# Patient Record
Sex: FEMALE | Race: BLACK OR AFRICAN AMERICAN | NOT HISPANIC OR LATINO
[De-identification: names, ages, dates, MRNs, and addresses within clinical notes are randomized per-mention and may not be internally consistent; named-entity substitution may affect disease eponyms.]

---

## 2023-12-29 ENCOUNTER — ASOB RESULT (OUTPATIENT)
Age: 33
End: 2023-12-29

## 2023-12-29 ENCOUNTER — APPOINTMENT (OUTPATIENT)
Dept: ANTEPARTUM | Facility: CLINIC | Age: 33
End: 2023-12-29
Payer: COMMERCIAL

## 2023-12-29 PROCEDURE — 76801 OB US < 14 WKS SINGLE FETUS: CPT

## 2023-12-29 PROCEDURE — 76802 OB US < 14 WKS ADDL FETUS: CPT

## 2024-01-11 ENCOUNTER — APPOINTMENT (OUTPATIENT)
Dept: ANTEPARTUM | Facility: CLINIC | Age: 34
End: 2024-01-11
Payer: COMMERCIAL

## 2024-01-11 ENCOUNTER — ASOB RESULT (OUTPATIENT)
Age: 34
End: 2024-01-11

## 2024-01-11 PROBLEM — Z00.00 ENCOUNTER FOR PREVENTIVE HEALTH EXAMINATION: Status: ACTIVE | Noted: 2024-01-11

## 2024-01-11 PROCEDURE — 76813 OB US NUCHAL MEAS 1 GEST: CPT | Mod: 59

## 2024-01-11 PROCEDURE — 76814 OB US NUCHAL MEAS ADD-ON: CPT

## 2024-01-11 PROCEDURE — 76801 OB US < 14 WKS SINGLE FETUS: CPT

## 2024-01-11 PROCEDURE — 76802 OB US < 14 WKS ADDL FETUS: CPT | Mod: 59

## 2024-01-11 PROCEDURE — 99202 OFFICE O/P NEW SF 15 MIN: CPT | Mod: 25

## 2024-02-09 ENCOUNTER — APPOINTMENT (OUTPATIENT)
Dept: ANTEPARTUM | Facility: CLINIC | Age: 34
End: 2024-02-09

## 2024-02-16 ENCOUNTER — ASOB RESULT (OUTPATIENT)
Age: 34
End: 2024-02-16

## 2024-02-16 ENCOUNTER — APPOINTMENT (OUTPATIENT)
Dept: ANTEPARTUM | Facility: CLINIC | Age: 34
End: 2024-02-16
Payer: COMMERCIAL

## 2024-02-16 PROCEDURE — 76817 TRANSVAGINAL US OBSTETRIC: CPT

## 2024-02-16 PROCEDURE — 76816 OB US FOLLOW-UP PER FETUS: CPT | Mod: 59

## 2024-03-08 ENCOUNTER — APPOINTMENT (OUTPATIENT)
Dept: ANTEPARTUM | Facility: CLINIC | Age: 34
End: 2024-03-08
Payer: COMMERCIAL

## 2024-03-08 ENCOUNTER — ASOB RESULT (OUTPATIENT)
Age: 34
End: 2024-03-08

## 2024-03-08 PROCEDURE — 76817 TRANSVAGINAL US OBSTETRIC: CPT

## 2024-03-08 PROCEDURE — 76812 OB US DETAILED ADDL FETUS: CPT | Mod: 59

## 2024-03-08 PROCEDURE — 76811 OB US DETAILED SNGL FETUS: CPT | Mod: 59

## 2024-03-21 ENCOUNTER — ASOB RESULT (OUTPATIENT)
Age: 34
End: 2024-03-21

## 2024-03-21 ENCOUNTER — APPOINTMENT (OUTPATIENT)
Dept: ANTEPARTUM | Facility: CLINIC | Age: 34
End: 2024-03-21
Payer: COMMERCIAL

## 2024-03-21 PROCEDURE — 76819 FETAL BIOPHYS PROFIL W/O NST: CPT

## 2024-03-21 PROCEDURE — 76816 OB US FOLLOW-UP PER FETUS: CPT

## 2024-04-05 ENCOUNTER — APPOINTMENT (OUTPATIENT)
Dept: ANTEPARTUM | Facility: CLINIC | Age: 34
End: 2024-04-05
Payer: COMMERCIAL

## 2024-04-05 ENCOUNTER — ASOB RESULT (OUTPATIENT)
Age: 34
End: 2024-04-05

## 2024-04-05 PROCEDURE — 76816 OB US FOLLOW-UP PER FETUS: CPT

## 2024-05-02 ENCOUNTER — ASOB RESULT (OUTPATIENT)
Age: 34
End: 2024-05-02

## 2024-05-02 ENCOUNTER — APPOINTMENT (OUTPATIENT)
Dept: ANTEPARTUM | Facility: CLINIC | Age: 34
End: 2024-05-02
Payer: COMMERCIAL

## 2024-05-02 PROCEDURE — 76819 FETAL BIOPHYS PROFIL W/O NST: CPT

## 2024-05-02 PROCEDURE — 76816 OB US FOLLOW-UP PER FETUS: CPT

## 2024-05-30 ENCOUNTER — ASOB RESULT (OUTPATIENT)
Age: 34
End: 2024-05-30

## 2024-05-30 ENCOUNTER — APPOINTMENT (OUTPATIENT)
Dept: ANTEPARTUM | Facility: CLINIC | Age: 34
End: 2024-05-30
Payer: COMMERCIAL

## 2024-05-30 PROCEDURE — 76816 OB US FOLLOW-UP PER FETUS: CPT

## 2024-05-30 PROCEDURE — 76819 FETAL BIOPHYS PROFIL W/O NST: CPT | Mod: 59

## 2024-06-28 ENCOUNTER — APPOINTMENT (OUTPATIENT)
Dept: ANTEPARTUM | Facility: CLINIC | Age: 34
End: 2024-06-28

## 2024-06-28 ENCOUNTER — ASOB RESULT (OUTPATIENT)
Age: 34
End: 2024-06-28

## 2024-06-28 ENCOUNTER — OUTPATIENT (OUTPATIENT)
Dept: INPATIENT UNIT | Facility: HOSPITAL | Age: 34
LOS: 1 days | Discharge: HOME | End: 2024-06-28
Payer: COMMERCIAL

## 2024-06-28 VITALS — HEART RATE: 76 BPM | SYSTOLIC BLOOD PRESSURE: 119 MMHG | DIASTOLIC BLOOD PRESSURE: 73 MMHG

## 2024-06-28 VITALS — RESPIRATION RATE: 17 BRPM | TEMPERATURE: 99 F

## 2024-06-28 DIAGNOSIS — O26.899 OTHER SPECIFIED PREGNANCY RELATED CONDITIONS, UNSPECIFIED TRIMESTER: ICD-10-CM

## 2024-06-28 PROCEDURE — 76816 OB US FOLLOW-UP PER FETUS: CPT | Mod: 59

## 2024-06-28 PROCEDURE — 76818 FETAL BIOPHYS PROFILE W/NST: CPT | Mod: 59

## 2024-06-28 PROCEDURE — 99221 1ST HOSP IP/OBS SF/LOW 40: CPT | Mod: 25

## 2024-06-28 PROCEDURE — 59025 FETAL NON-STRESS TEST: CPT | Mod: 26

## 2024-06-28 PROCEDURE — 76816 OB US FOLLOW-UP PER FETUS: CPT

## 2024-07-01 ENCOUNTER — OUTPATIENT (OUTPATIENT)
Dept: OUTPATIENT SERVICES | Facility: HOSPITAL | Age: 34
LOS: 1 days | End: 2024-07-01

## 2024-07-01 VITALS
HEIGHT: 67.5 IN | HEART RATE: 96 BPM | OXYGEN SATURATION: 99 % | TEMPERATURE: 98 F | SYSTOLIC BLOOD PRESSURE: 128 MMHG | DIASTOLIC BLOOD PRESSURE: 84 MMHG | RESPIRATION RATE: 18 BRPM | WEIGHT: 225.09 LBS

## 2024-07-01 DIAGNOSIS — O36.8330 MATERNAL CARE FOR ABNORMALITIES OF THE FETAL HEART RATE OR RHYTHM, THIRD TRIMESTER, NOT APPLICABLE OR UNSPECIFIED: ICD-10-CM

## 2024-07-01 DIAGNOSIS — O30.043 TWIN PREGNANCY, DICHORIONIC/DIAMNIOTIC, THIRD TRIMESTER: ICD-10-CM

## 2024-07-01 DIAGNOSIS — K08.409 PARTIAL LOSS OF TEETH, UNSPECIFIED CAUSE, UNSPECIFIED CLASS: Chronic | ICD-10-CM

## 2024-07-01 DIAGNOSIS — O34.219 MATERNAL CARE FOR UNSPECIFIED TYPE SCAR FROM PREVIOUS CESAREAN DELIVERY: ICD-10-CM

## 2024-07-01 DIAGNOSIS — Z98.890 OTHER SPECIFIED POSTPROCEDURAL STATES: Chronic | ICD-10-CM

## 2024-07-01 DIAGNOSIS — Z3A.36 36 WEEKS GESTATION OF PREGNANCY: ICD-10-CM

## 2024-07-01 LAB
APPEARANCE UR: ABNORMAL
BACTERIA # UR AUTO: ABNORMAL /HPF
BILIRUB UR-MCNC: NEGATIVE — SIGNIFICANT CHANGE UP
BLD GP AB SCN SERPL QL: NEGATIVE — SIGNIFICANT CHANGE UP
CAST: 4 /LPF — SIGNIFICANT CHANGE UP (ref 0–4)
COLOR SPEC: YELLOW — SIGNIFICANT CHANGE UP
DIFF PNL FLD: NEGATIVE — SIGNIFICANT CHANGE UP
GLUCOSE UR QL: NEGATIVE MG/DL — SIGNIFICANT CHANGE UP
HCT VFR BLD CALC: 30.7 % — LOW (ref 34.5–45)
HGB BLD-MCNC: 10.7 G/DL — LOW (ref 11.5–15.5)
KETONES UR-MCNC: 15 MG/DL
LEUKOCYTE ESTERASE UR-ACNC: ABNORMAL
MCHC RBC-ENTMCNC: 33.4 PG — SIGNIFICANT CHANGE UP (ref 27–34)
MCHC RBC-ENTMCNC: 34.9 GM/DL — SIGNIFICANT CHANGE UP (ref 32–36)
MCV RBC AUTO: 95.9 FL — SIGNIFICANT CHANGE UP (ref 80–100)
NITRITE UR-MCNC: NEGATIVE — SIGNIFICANT CHANGE UP
NRBC # BLD: 0 /100 WBCS — SIGNIFICANT CHANGE UP (ref 0–0)
NRBC # FLD: 0 K/UL — SIGNIFICANT CHANGE UP (ref 0–0)
PH UR: 6 — SIGNIFICANT CHANGE UP (ref 5–8)
PLATELET # BLD AUTO: 245 K/UL — SIGNIFICANT CHANGE UP (ref 150–400)
PROT UR-MCNC: 30 MG/DL
RBC # BLD: 3.2 M/UL — LOW (ref 3.8–5.2)
RBC # FLD: 12.6 % — SIGNIFICANT CHANGE UP (ref 10.3–14.5)
RBC CASTS # UR COMP ASSIST: 4 /HPF — SIGNIFICANT CHANGE UP (ref 0–4)
REVIEW: SIGNIFICANT CHANGE UP
RH IG SCN BLD-IMP: POSITIVE — SIGNIFICANT CHANGE UP
SP GR SPEC: 1.01 — SIGNIFICANT CHANGE UP (ref 1–1.03)
SQUAMOUS # UR AUTO: 10 /HPF — HIGH (ref 0–5)
UROBILINOGEN FLD QL: 0.2 MG/DL — SIGNIFICANT CHANGE UP (ref 0.2–1)
WBC # BLD: 7.33 K/UL — SIGNIFICANT CHANGE UP (ref 3.8–10.5)
WBC # FLD AUTO: 7.33 K/UL — SIGNIFICANT CHANGE UP (ref 3.8–10.5)
WBC UR QL: 8 /HPF — HIGH (ref 0–5)

## 2024-07-02 PROBLEM — Z78.9 OTHER SPECIFIED HEALTH STATUS: Chronic | Status: INACTIVE | Noted: 2024-06-28 | Resolved: 2024-07-01

## 2024-07-05 ENCOUNTER — APPOINTMENT (OUTPATIENT)
Dept: ANTEPARTUM | Facility: CLINIC | Age: 34
End: 2024-07-05
Payer: COMMERCIAL

## 2024-07-05 ENCOUNTER — ASOB RESULT (OUTPATIENT)
Age: 34
End: 2024-07-05

## 2024-07-05 PROCEDURE — 76818 FETAL BIOPHYS PROFILE W/NST: CPT

## 2024-07-05 PROCEDURE — 76818 FETAL BIOPHYS PROFILE W/NST: CPT | Mod: 59

## 2024-07-06 ENCOUNTER — TRANSCRIPTION ENCOUNTER (OUTPATIENT)
Age: 34
End: 2024-07-06

## 2024-07-07 ENCOUNTER — INPATIENT (INPATIENT)
Facility: HOSPITAL | Age: 34
LOS: 2 days | Discharge: HOME | End: 2024-07-10
Attending: OBSTETRICS & GYNECOLOGY | Admitting: OBSTETRICS & GYNECOLOGY
Payer: COMMERCIAL

## 2024-07-07 VITALS
SYSTOLIC BLOOD PRESSURE: 145 MMHG | HEART RATE: 80 BPM | TEMPERATURE: 99 F | RESPIRATION RATE: 16 BRPM | DIASTOLIC BLOOD PRESSURE: 88 MMHG

## 2024-07-07 DIAGNOSIS — O26.899 OTHER SPECIFIED PREGNANCY RELATED CONDITIONS, UNSPECIFIED TRIMESTER: ICD-10-CM

## 2024-07-07 DIAGNOSIS — K08.409 PARTIAL LOSS OF TEETH, UNSPECIFIED CAUSE, UNSPECIFIED CLASS: Chronic | ICD-10-CM

## 2024-07-07 DIAGNOSIS — Z98.890 OTHER SPECIFIED POSTPROCEDURAL STATES: Chronic | ICD-10-CM

## 2024-07-07 PROBLEM — H57.89 OTHER SPECIFIED DISORDERS OF EYE AND ADNEXA: Chronic | Status: ACTIVE | Noted: 2024-07-01

## 2024-07-07 LAB
ALBUMIN SERPL ELPH-MCNC: 3.5 G/DL — SIGNIFICANT CHANGE UP (ref 3.3–5)
ALP SERPL-CCNC: 178 U/L — HIGH (ref 40–120)
ALT FLD-CCNC: 10 U/L — SIGNIFICANT CHANGE UP (ref 4–33)
ANION GAP SERPL CALC-SCNC: 14 MMOL/L — SIGNIFICANT CHANGE UP (ref 7–14)
APPEARANCE UR: CLEAR — SIGNIFICANT CHANGE UP
AST SERPL-CCNC: 14 U/L — SIGNIFICANT CHANGE UP (ref 4–32)
BACTERIA # UR AUTO: NEGATIVE /HPF — SIGNIFICANT CHANGE UP
BASOPHILS # BLD AUTO: 0.02 K/UL — SIGNIFICANT CHANGE UP (ref 0–0.2)
BASOPHILS NFR BLD AUTO: 0.3 % — SIGNIFICANT CHANGE UP (ref 0–2)
BILIRUB SERPL-MCNC: 1.3 MG/DL — HIGH (ref 0.2–1.2)
BILIRUB UR-MCNC: NEGATIVE — SIGNIFICANT CHANGE UP
BLD GP AB SCN SERPL QL: NEGATIVE — SIGNIFICANT CHANGE UP
BUN SERPL-MCNC: 5 MG/DL — LOW (ref 7–23)
CALCIUM SERPL-MCNC: 9.5 MG/DL — SIGNIFICANT CHANGE UP (ref 8.4–10.5)
CAST: 0 /LPF — SIGNIFICANT CHANGE UP (ref 0–4)
CHLORIDE SERPL-SCNC: 103 MMOL/L — SIGNIFICANT CHANGE UP (ref 98–107)
CO2 SERPL-SCNC: 21 MMOL/L — LOW (ref 22–31)
COLOR SPEC: YELLOW — SIGNIFICANT CHANGE UP
CREAT ?TM UR-MCNC: 37 MG/DL — SIGNIFICANT CHANGE UP
CREAT SERPL-MCNC: 0.83 MG/DL — SIGNIFICANT CHANGE UP (ref 0.5–1.3)
DIFF PNL FLD: ABNORMAL
EGFR: 96 ML/MIN/1.73M2 — SIGNIFICANT CHANGE UP
EOSINOPHIL # BLD AUTO: 0.05 K/UL — SIGNIFICANT CHANGE UP (ref 0–0.5)
EOSINOPHIL NFR BLD AUTO: 0.7 % — SIGNIFICANT CHANGE UP (ref 0–6)
GLUCOSE SERPL-MCNC: 76 MG/DL — SIGNIFICANT CHANGE UP (ref 70–99)
GLUCOSE UR QL: NEGATIVE MG/DL — SIGNIFICANT CHANGE UP
HCT VFR BLD CALC: 31 % — LOW (ref 34.5–45)
HGB BLD-MCNC: 10.9 G/DL — LOW (ref 11.5–15.5)
IANC: 4.8 K/UL — SIGNIFICANT CHANGE UP (ref 1.8–7.4)
IMM GRANULOCYTES NFR BLD AUTO: 0.3 % — SIGNIFICANT CHANGE UP (ref 0–0.9)
KETONES UR-MCNC: NEGATIVE MG/DL — SIGNIFICANT CHANGE UP
LDH SERPL L TO P-CCNC: 178 U/L — SIGNIFICANT CHANGE UP (ref 135–225)
LEUKOCYTE ESTERASE UR-ACNC: NEGATIVE — SIGNIFICANT CHANGE UP
LYMPHOCYTES # BLD AUTO: 1.63 K/UL — SIGNIFICANT CHANGE UP (ref 1–3.3)
LYMPHOCYTES # BLD AUTO: 23.4 % — SIGNIFICANT CHANGE UP (ref 13–44)
MCHC RBC-ENTMCNC: 34 PG — SIGNIFICANT CHANGE UP (ref 27–34)
MCHC RBC-ENTMCNC: 35.2 GM/DL — SIGNIFICANT CHANGE UP (ref 32–36)
MCV RBC AUTO: 96.6 FL — SIGNIFICANT CHANGE UP (ref 80–100)
MONOCYTES # BLD AUTO: 0.46 K/UL — SIGNIFICANT CHANGE UP (ref 0–0.9)
MONOCYTES NFR BLD AUTO: 6.6 % — SIGNIFICANT CHANGE UP (ref 2–14)
NEUTROPHILS # BLD AUTO: 4.8 K/UL — SIGNIFICANT CHANGE UP (ref 1.8–7.4)
NEUTROPHILS NFR BLD AUTO: 68.7 % — SIGNIFICANT CHANGE UP (ref 43–77)
NITRITE UR-MCNC: NEGATIVE — SIGNIFICANT CHANGE UP
NRBC # BLD: 0 /100 WBCS — SIGNIFICANT CHANGE UP (ref 0–0)
NRBC # FLD: 0 K/UL — SIGNIFICANT CHANGE UP (ref 0–0)
PH UR: 7 — SIGNIFICANT CHANGE UP (ref 5–8)
PLATELET # BLD AUTO: 222 K/UL — SIGNIFICANT CHANGE UP (ref 150–400)
POTASSIUM SERPL-MCNC: 3.8 MMOL/L — SIGNIFICANT CHANGE UP (ref 3.5–5.3)
POTASSIUM SERPL-SCNC: 3.8 MMOL/L — SIGNIFICANT CHANGE UP (ref 3.5–5.3)
PROT ?TM UR-MCNC: 7 MG/DL — SIGNIFICANT CHANGE UP
PROT SERPL-MCNC: 6.6 G/DL — SIGNIFICANT CHANGE UP (ref 6–8.3)
PROT UR-MCNC: NEGATIVE MG/DL — SIGNIFICANT CHANGE UP
PROT/CREAT UR-RTO: 0.2 RATIO — SIGNIFICANT CHANGE UP (ref 0–0.2)
RBC # BLD: 3.21 M/UL — LOW (ref 3.8–5.2)
RBC # FLD: 12.3 % — SIGNIFICANT CHANGE UP (ref 10.3–14.5)
RBC CASTS # UR COMP ASSIST: 0 /HPF — SIGNIFICANT CHANGE UP (ref 0–4)
RH IG SCN BLD-IMP: POSITIVE — SIGNIFICANT CHANGE UP
SODIUM SERPL-SCNC: 138 MMOL/L — SIGNIFICANT CHANGE UP (ref 135–145)
SP GR SPEC: 1 — SIGNIFICANT CHANGE UP (ref 1–1.03)
SQUAMOUS # UR AUTO: 1 /HPF — SIGNIFICANT CHANGE UP (ref 0–5)
URATE SERPL-MCNC: 6.4 MG/DL — SIGNIFICANT CHANGE UP (ref 2.5–7)
UROBILINOGEN FLD QL: 0.2 MG/DL — SIGNIFICANT CHANGE UP (ref 0.2–1)
WBC # BLD: 6.98 K/UL — SIGNIFICANT CHANGE UP (ref 3.8–10.5)
WBC # FLD AUTO: 6.98 K/UL — SIGNIFICANT CHANGE UP (ref 3.8–10.5)
WBC UR QL: 1 /HPF — SIGNIFICANT CHANGE UP (ref 0–5)

## 2024-07-07 PROCEDURE — 88307 TISSUE EXAM BY PATHOLOGIST: CPT | Mod: 26

## 2024-07-07 DEVICE — ARISTA 3GR: Type: IMPLANTABLE DEVICE | Status: FUNCTIONAL

## 2024-07-07 RX ORDER — PRENATAL VIT/IRON FUM/FOLIC AC 60 MG-1 MG
1 TABLET ORAL
Refills: 0 | DISCHARGE

## 2024-07-07 RX ORDER — OXYTOCIN 30 [USP'U]/500ML
333.33 INJECTION, SOLUTION INTRAVENOUS
Qty: 20 | Refills: 0 | Status: COMPLETED | OUTPATIENT
Start: 2024-07-07 | End: 2024-07-07

## 2024-07-07 RX ORDER — LANOLIN
1 WAX (GRAM) MISCELLANEOUS EVERY 6 HOURS
Refills: 0 | Status: DISCONTINUED | OUTPATIENT
Start: 2024-07-07 | End: 2024-07-10

## 2024-07-07 RX ORDER — NALOXONE HYDROCHLORIDE 1 MG/ML
0.1 INJECTION PARENTERAL
Refills: 0 | Status: DISCONTINUED | OUTPATIENT
Start: 2024-07-07 | End: 2024-07-08

## 2024-07-07 RX ORDER — ACETAMINOPHEN 325 MG
975 TABLET ORAL
Refills: 0 | Status: DISCONTINUED | OUTPATIENT
Start: 2024-07-07 | End: 2024-07-10

## 2024-07-07 RX ORDER — AMPICILLIN TRIHYDRATE 250 MG
1 CAPSULE ORAL EVERY 4 HOURS
Refills: 0 | Status: DISCONTINUED | OUTPATIENT
Start: 2024-07-07 | End: 2024-07-07

## 2024-07-07 RX ORDER — DIPHENHYDRAMINE HCL 12.5MG/5ML
25 ELIXIR ORAL EVERY 6 HOURS
Refills: 0 | Status: COMPLETED | OUTPATIENT
Start: 2024-07-07 | End: 2025-06-05

## 2024-07-07 RX ORDER — DEXTROSE MONOHYDRATE AND SODIUM CHLORIDE 5; .3 G/100ML; G/100ML
1000 INJECTION, SOLUTION INTRAVENOUS
Refills: 0 | Status: DISCONTINUED | OUTPATIENT
Start: 2024-07-07 | End: 2024-07-07

## 2024-07-07 RX ORDER — KETOROLAC TROMETHAMINE 30 MG/ML
30 INJECTION, SOLUTION INTRAMUSCULAR EVERY 6 HOURS
Refills: 0 | Status: DISCONTINUED | OUTPATIENT
Start: 2024-07-07 | End: 2024-07-08

## 2024-07-07 RX ORDER — OXYCODONE HYDROCHLORIDE 100 MG/5ML
5 SOLUTION ORAL ONCE
Refills: 0 | Status: DISCONTINUED | OUTPATIENT
Start: 2024-07-07 | End: 2024-07-10

## 2024-07-07 RX ORDER — HEPARIN SODIUM 50 [USP'U]/ML
5000 INJECTION, SOLUTION INTRAVENOUS EVERY 12 HOURS
Refills: 0 | Status: DISCONTINUED | OUTPATIENT
Start: 2024-07-07 | End: 2024-07-10

## 2024-07-07 RX ORDER — SIMETHICONE 40MG/0.6ML
80 SUSPENSION, DROPS(FINAL DOSAGE FORM)(ML) ORAL EVERY 4 HOURS
Refills: 0 | Status: DISCONTINUED | OUTPATIENT
Start: 2024-07-07 | End: 2024-07-10

## 2024-07-07 RX ORDER — OXYCODONE HYDROCHLORIDE 100 MG/5ML
5 SOLUTION ORAL
Refills: 0 | Status: COMPLETED | OUTPATIENT
Start: 2024-07-07 | End: 2024-07-14

## 2024-07-07 RX ORDER — OXYTOCIN 30 [USP'U]/500ML
333.33 INJECTION, SOLUTION INTRAVENOUS
Qty: 20 | Refills: 0 | Status: DISCONTINUED | OUTPATIENT
Start: 2024-07-07 | End: 2024-07-07

## 2024-07-07 RX ORDER — ASPIRIN 325 MG/1
0 TABLET, FILM COATED ORAL
Refills: 0 | DISCHARGE

## 2024-07-07 RX ORDER — PRENATAL VIT/IRON FUM/FOLIC AC 60 MG-1 MG
1 TABLET ORAL DAILY
Refills: 0 | Status: DISCONTINUED | OUTPATIENT
Start: 2024-07-07 | End: 2024-07-10

## 2024-07-07 RX ORDER — TETANUS TOXOID, REDUCED DIPHTHERIA TOXOID AND ACELLULAR PERTUSSIS VACCINE, ADSORBED 5; 2.5; 8; 8; 2.5 [IU]/.5ML; [IU]/.5ML; UG/.5ML; UG/.5ML; UG/.5ML
0.5 SUSPENSION INTRAMUSCULAR ONCE
Refills: 0 | Status: DISCONTINUED | OUTPATIENT
Start: 2024-07-07 | End: 2024-07-10

## 2024-07-07 RX ORDER — ONDANSETRON HYDROCHLORIDE 2 MG/ML
4 INJECTION INTRAMUSCULAR; INTRAVENOUS EVERY 6 HOURS
Refills: 0 | Status: DISCONTINUED | OUTPATIENT
Start: 2024-07-07 | End: 2024-07-08

## 2024-07-07 RX ORDER — DEXAMETHASONE 1 MG/1
4 TABLET ORAL EVERY 6 HOURS
Refills: 0 | Status: DISCONTINUED | OUTPATIENT
Start: 2024-07-07 | End: 2024-07-08

## 2024-07-07 RX ORDER — FERROUS SULFATE 325(65) MG
325 TABLET ORAL DAILY
Refills: 0 | Status: DISCONTINUED | OUTPATIENT
Start: 2024-07-07 | End: 2024-07-10

## 2024-07-07 RX ORDER — TRISODIUM CITRATE DIHYDRATE AND CITRIC ACID MONOHYDRATE 500; 334 MG/5ML; MG/5ML
15 SOLUTION ORAL EVERY 6 HOURS
Refills: 0 | Status: DISCONTINUED | OUTPATIENT
Start: 2024-07-07 | End: 2024-07-07

## 2024-07-07 RX ORDER — SENNOSIDES 8.6 MG
2 TABLET ORAL AT BEDTIME
Refills: 0 | Status: DISCONTINUED | OUTPATIENT
Start: 2024-07-07 | End: 2024-07-10

## 2024-07-07 RX ORDER — AMPICILLIN TRIHYDRATE 250 MG
2 CAPSULE ORAL ONCE
Refills: 0 | Status: COMPLETED | OUTPATIENT
Start: 2024-07-07 | End: 2024-07-07

## 2024-07-07 RX ADMIN — Medication 975 MILLIGRAM(S): at 22:05

## 2024-07-07 RX ADMIN — HEPARIN SODIUM 5000 UNIT(S): 50 INJECTION, SOLUTION INTRAVENOUS at 11:47

## 2024-07-07 RX ADMIN — DEXTROSE MONOHYDRATE AND SODIUM CHLORIDE 125 MILLILITER(S): 5; .3 INJECTION, SOLUTION INTRAVENOUS at 06:57

## 2024-07-07 RX ADMIN — KETOROLAC TROMETHAMINE 30 MILLIGRAM(S): 30 INJECTION, SOLUTION INTRAMUSCULAR at 11:47

## 2024-07-07 RX ADMIN — KETOROLAC TROMETHAMINE 30 MILLIGRAM(S): 30 INJECTION, SOLUTION INTRAMUSCULAR at 18:26

## 2024-07-07 RX ADMIN — KETOROLAC TROMETHAMINE 30 MILLIGRAM(S): 30 INJECTION, SOLUTION INTRAMUSCULAR at 12:17

## 2024-07-07 RX ADMIN — OXYTOCIN 1000 MILLIUNIT(S)/MIN: 30 INJECTION, SOLUTION INTRAVENOUS at 06:57

## 2024-07-07 RX ADMIN — Medication 975 MILLIGRAM(S): at 22:35

## 2024-07-07 RX ADMIN — HEPARIN SODIUM 5000 UNIT(S): 50 INJECTION, SOLUTION INTRAVENOUS at 23:46

## 2024-07-07 RX ADMIN — Medication 200 GRAM(S): at 03:02

## 2024-07-07 RX ADMIN — Medication 1 APPLICATION(S): at 03:02

## 2024-07-07 RX ADMIN — KETOROLAC TROMETHAMINE 30 MILLIGRAM(S): 30 INJECTION, SOLUTION INTRAMUSCULAR at 23:47

## 2024-07-07 NOTE — OB PROVIDER H&P - NSHPPHYSICALEXAM_GEN_ALL_CORE
T(C): 37 (07-07-24 @ 01:19), Max: 37 (07-07-24 @ 01:19)  HR: 74 (07-07-24 @ 02:12) (74 - 81)  BP: 123/81 (07-07-24 @ 02:12) (123/81 - 145/88)  RR: 16 (07-07-24 @ 01:19) (16 - 16)    Heart: RRR  Lungs: CTA  Abdomen: Gravid, soft, NT    NST: Reactive with moderate variability, Category 1 tracing  [place]: Irregular contractions  VE: 2/70/-3, intact membranes  TAS: TIUP, Cephalic/Breech

## 2024-07-07 NOTE — DISCHARGE NOTE OB - ADDITIONAL INSTRUCTIONS
Nothing per vagina or strenuous activity x 8 weeks   return to see the doctor in 2 weeks   If fever, severe abdominal pain, heavy vaginal bleeding, problems with the incision, Chest or leg pain, shortness of breath severe headache, visual changes or epigastric pain- return to the hospital or call your doctor. Nothing per vagina or strenuous activity x 8 weeks   return to see the doctor within 1 week for B/P check  If fever, severe abdominal pain, heavy vaginal bleeding, problems with the incision, Chest or leg pain, shortness of breath severe headache, visual changes or epigastric pain- return to the hospital or call your doctor.

## 2024-07-07 NOTE — DISCHARGE NOTE OB - PLAN OF CARE
Twins malpresentation at 38 wks in labor -- primary C/S -- postop recovery Twins malpresentation at 38 wks in labor -- primary C/S -- postop recovery  After discharge, please stay on pelvic rest for 6 weeks, meaning no sexual intercourse, no tampons and no douching.  No driving for 2 weeks as women can loose a lot of blood during delivery and there is a possibility of being lightheaded/fainting.  No lifting objects heavier than baby for two weeks.  Expect to have vaginal bleeding/spotting for up to six weeks.  The bleeding should get lighter and more white/light brown with time.  For bleeding soaking more than a pad an hour or passing clots greater than the size of your fist, come in to the emergency department.    Follow up in OB office in 1-2 weeks for incision check.  Call for noticeable increase in redness or swelling at incision, discharge from incision, or opening of skin at incision site Follow-up in your OB office within 1 week for a blood pressure check.   Please take your blood pressure 3x/day. Call your doctor if your blood pressure is 140 (top number) OR 90 (bottom number) or higher. Return to hospital if your blood pressure is 160 (top number)  (bottom number) or higher. Call your doctor if you experience symptoms such as headache, blurry vision, epigastric pain, or nausea/vomiting.

## 2024-07-07 NOTE — OB PROVIDER DELIVERY SUMMARY - NSSELHIDDEN_OBGYN_ALL_OB_FT
[NS_DeliveryAttending1_OBGYN_ALL_OB_FT:[id number]],[NS_DeliveryRN_OBGYN_ALL_OB_FT:[id number]],[NS_DeliveryAssist1_OBGYN_ALL_OB_FT:[id number]=]

## 2024-07-07 NOTE — DISCHARGE NOTE OB - CARE PROVIDER_API CALL
Rox Reeder  Obstetrics and Gynecology  [address], [address]  [place] [address]  Phone: [phone]  Fax: [phone]  Follow Up Time:

## 2024-07-07 NOTE — DISCHARGE NOTE OB - MEDICATION SUMMARY - MEDICATIONS TO TAKE
I will START or STAY ON the medications listed below when I get home from the hospital:    ibuprofen 600 mg oral tablet  -- 1 tab(s) by mouth every 6 hours as needed for  moderate pain  -- Indication: For Pain    acetaminophen 325 mg oral tablet  -- 3 tab(s) by mouth every 6 hours as needed for  mild pain  -- Indication: For Pain    Prenatal 1 oral capsule  -- 1 tab(s) by mouth once a day  -- Indication: For Vitamin    ferrous sulfate 325 mg (65 mg elemental iron) oral tablet  -- 1 tab(s) by mouth once a day  -- Indication: For Anemia

## 2024-07-07 NOTE — DISCHARGE NOTE OB - PATIENT PORTAL LINK FT
You can access the FollowMyHealth Patient Portal offered by [facility] by registering at the following website: [email or web address]. By joining [facility]’s FollowMyHealth portal, you will also be able to view your health information using other applications (apps) compatible with our system.

## 2024-07-07 NOTE — OB PROVIDER H&P - HISTORY OF PRESENT ILLNESS
33y  at 38w0d JOSEPH Di-di presents to triage c/o strong uterine at home q 5mins.  Reports +FM, no vaginal bleeding, no ROM or LOF  Prenatal care: Dr Reeder; Pt is scheduled for primary C/s on Monday, 24  GBS: Positive 7/3/24  33y  at 38w0d JOSEPH Di-di presents to triage c/o strong uterine contractions at home q 5mins.  Reports +FM, no vaginal bleeding, no ROM or LOF  Prenatal care: Dr Reeder; Pt is scheduled for primary C/s on Monday, 24  GBS: Positive 7/3/24

## 2024-07-07 NOTE — OB RN INTRAOPERATIVE NOTE - NSSELHIDDEN_OBGYN_ALL_OB_FT
[NS_DeliveryAttending1_OBGYN_ALL_OB_FT:[email or web address]],[NS_DeliveryRN_OBGYN_ALL_OB_FT:[id number]] [NS_DeliveryAttending1_OBGYN_ALL_OB_FT:[id number]],[NS_DeliveryRN_OBGYN_ALL_OB_FT:[id number]],[NS_DeliveryAssist1_OBGYN_ALL_OB_FT:[id number]=]

## 2024-07-07 NOTE — OB PROVIDER DELIVERY SUMMARY - NSPROVIDERDELIVERYNOTE_OBGYN_ALL_OB_FT
pLTCS for di/di TIUP in labor    Baby A: Viable male infant. Vertex presentation. 2950g. APGARS 8/9  Baby B: Viable female infant. Baldomero breech presentation. 2510g. APGARS 8/9  Grossly normal uterus, bilateral tubes, and ovaries  Hysterotomy closed in x2 layers w/ 1 Caprosyn  Fascia reapproximated w/ 2-0 Vicryl in a running fashion   SubQ reapproximated w/ 2-0 Vicryl in a running fashion   Skin reapproximated w/ 3-0 Monocryl in a subcuticular fashion     760/2300/220    Dictation #:     Attending: Dr. Elian Caceres PGY2 pLTCS for di/di TIUP in labor    Baby A: Viable male infant. Vertex presentation. 2950g. APGARS 8/9  Baby B: Viable female infant. Baldomero breech presentation. 2510g. APGARS 8/9  Grossly normal uterus, bilateral tubes, and ovaries  Hysterotomy closed in x2 layers w/  Caprosyn  Fascia reapproximated w/ 0 Vicryl in a running fashion   SubQ reapproximated w/ 2-0 Vicryl in a running fashion   Skin reapproximated w/ 3-0 Monocryl in a subcuticular fashion     760/2300/220    Dictation #:     Attending: Dr. Elian Caceres PGY2 pLTCS for di/di TIUP in labor    Baby A: Viable male infant. Vertex presentation. 2950g. APGARS 8/9  Baby B: Viable female infant. Baldomero breech presentation. 2510g. APGARS 8/9  Grossly normal uterus, bilateral tubes, and ovaries  Hysterotomy closed in x2 layers w/  Caprosyn  Fascia reapproximated w/ 0 Vicryl in a running fashion   SubQ reapproximated w/ 2-0 Vicryl in a running fashion   Skin reapproximated w/ 3-0 Monocryl in a subcuticular fashion     760/2300/220    Dictation #: [id number]    Attending: Dr. Elian Caceres PGY2

## 2024-07-07 NOTE — OB RN DELIVERY SUMMARY - NS_SEPSISRSKCALC_OBGYN_ALL_OB_FT
EOS calculated successfully. EOS Risk Factor: 0.5/1000 live births ([facility] national incidence); GA=38w;Temp=98.6; ROM=0.017; GBS='Positive'; Antibiotics='No antibiotics or any antibiotics < 2 hrs prior to birth'

## 2024-07-07 NOTE — DISCHARGE NOTE OB - HOSPITAL COURSE
33y  at 38w0d TIUP Di-di in labor and noted VTX/ Breech, She has been scheduled with Dr Reeder for  section on 24.   She was progressively more uncomfortable with contractions- Course Admitted to labor and delivery and delivered by primary LFT C/section delivery. Boy Twin A  and Girl Twin B   +Elevated BP in triage 145/88, denies any symptoms of PEC - Labs sent and noted normal

## 2024-07-07 NOTE — DISCHARGE NOTE OB - CARE PLAN
Principal Discharge DX:	 delivery delivered  Assessment and plan of treatment:	Twins malpresentation at 38 wks in labor -- primary C/S -- postop recovery   1 Principal Discharge DX:	 delivery delivered  Assessment and plan of treatment:	Twins malpresentation at 38 wks in labor -- primary C/S -- postop recovery  After discharge, please stay on pelvic rest for 6 weeks, meaning no sexual intercourse, no tampons and no douching.  No driving for 2 weeks as women can loose a lot of blood during delivery and there is a possibility of being lightheaded/fainting.  No lifting objects heavier than baby for two weeks.  Expect to have vaginal bleeding/spotting for up to six weeks.  The bleeding should get lighter and more white/light brown with time.  For bleeding soaking more than a pad an hour or passing clots greater than the size of your fist, come in to the emergency department.    Follow up in OB office in 1-2 weeks for incision check.  Call for noticeable increase in redness or swelling at incision, discharge from incision, or opening of skin at incision site   Principal Discharge DX:	 delivery delivered  Assessment and plan of treatment:	Twins malpresentation at 38 wks in labor -- primary C/S -- postop recovery  After discharge, please stay on pelvic rest for 6 weeks, meaning no sexual intercourse, no tampons and no douching.  No driving for 2 weeks as women can loose a lot of blood during delivery and there is a possibility of being lightheaded/fainting.  No lifting objects heavier than baby for two weeks.  Expect to have vaginal bleeding/spotting for up to six weeks.  The bleeding should get lighter and more white/light brown with time.  For bleeding soaking more than a pad an hour or passing clots greater than the size of your fist, come in to the emergency department.    Follow up in OB office in 1-2 weeks for incision check.  Call for noticeable increase in redness or swelling at incision, discharge from incision, or opening of skin at incision site  Secondary Diagnosis:	Gestational HTN  Assessment and plan of treatment:	Follow-up in your OB office within 1 week for a blood pressure check.   Please take your blood pressure 3x/day. Call your doctor if your blood pressure is 140 (top number) OR 90 (bottom number) or higher. Return to hospital if your blood pressure is 160 (top number)  (bottom number) or higher. Call your doctor if you experience symptoms such as headache, blurry vision, epigastric pain, or nausea/vomiting.

## 2024-07-07 NOTE — LACTATION INITIAL EVALUATION - LACTATION INTERVENTIONS
Primary RN  made aware of consult and plan./initiate/review safe skin-to-skin/initiate/review hand expression/initiate/review techniques for position and latch/post discharge community resources provided/initiate/review breast massage/compression/reviewed components of an effective feeding and at least 8 effective feedings per day required/reviewed importance of monitoring infant diapers, the breastfeeding log, and minimum output each day/reviewed risks of artificial nipples/reviewed benefits and recommendations for rooming in/reviewed feeding on demand/by cue at least 8 times a day

## 2024-07-07 NOTE — DISCHARGE NOTE OB - NS MD DC FALL RISK RISK
For information on Fall & Injury Prevention, visit: [email or web address] OR  [email or web address] OR  https://www.cdc.gov/steadi/patient.html

## 2024-07-07 NOTE — OB RN PATIENT PROFILE - FALL HARM RISK - UNIVERSAL INTERVENTIONS
Bed in lowest position, wheels locked, appropriate side rails in place/Call bell, personal items and telephone in reach/Instruct patient to call for assistance before getting out of bed or chair/Non-slip footwear when patient is out of bed/[place] to call system/Physically safe environment - no spills, clutter or unnecessary equipment/Purposeful Proactive Rounding/Room/bathroom lighting operational, light cord in reach

## 2024-07-07 NOTE — DISCHARGE NOTE OB - MATERIALS PROVIDED
Vaccinations/[facility]  Screening Program/  Immunization Record/Breastfeeding Log/Bottle Feeding Log/Breastfeeding Mother’s Support Group Information/Guide to Postpartum Care/[facility] Hearing Screen Program/Back To Sleep Handout/Shaken Baby Prevention Handout/Breastfeeding Guide and Packet/Birth Certificate Instructions/Discharge Medication Information for Patients and Families Pocket Guide

## 2024-07-07 NOTE — OB RN TRIAGE NOTE - FALL HARM RISK - UNIVERSAL INTERVENTIONS
Bed in lowest position, wheels locked, appropriate side rails in place/Call bell, personal items and telephone in reach/Instruct patient to call for assistance before getting out of bed or chair/Non-slip footwear when patient is out of bed/[place] to call system/Physically safe environment - no spills, clutter or unnecessary equipment/Purposeful Proactive Rounding/Room/bathroom lighting operational, light cord in reach

## 2024-07-07 NOTE — OB RN DELIVERY SUMMARY - NSSELHIDDEN_OBGYN_ALL_OB_FT
[NS_DeliveryAttending1_OBGYN_ALL_OB_FT:[id number]],[NS_DeliveryRN_OBGYN_ALL_OB_FT:[id number]]

## 2024-07-07 NOTE — OB PROVIDER H&P - ATTENDING COMMENTS
33y  at 38w0d TIUP Di-di in labor  and noted VTX/ Breech, She has been scheduled with Dr Reeder for  section on 24   Patient progressively more uncomfortable with contractions  +Elevated BP in triage 145/88, denies any symptoms of PEC - Labs sent   Plan Admit to labor and delivery for primary C/section delivery   C Elian

## 2024-07-07 NOTE — OB PROVIDER H&P - ASSESSMENT
33y  at 38w0d TIUP Di-di in early labor  Pt is very uncomfortable with contractions  +Elevated BP in triage 145/88, denies any symptoms  D/w Dr Plummer  -Admit to labor and delivery for primary C/section  -Pain Management prn  -Cont EFM/[place]  -Admission labs: CBC, RPR, T&S  -PEC labs sent  -IV hydration  -NPO since

## 2024-07-07 NOTE — OB PROVIDER H&P - PROBLEM SELECTOR PLAN 1
D/w Dr Plummer  -Admit to labor and delivery for primary C/section  -Pain Management prn  -Cont EFM/[place]  -Admission labs: CBC, RPR, T&S  -PEC labs sent  -IV hydration  -NPO since 2140

## 2024-07-07 NOTE — OB NEONATOLOGY/PEDIATRICIAN DELIVERY SUMMARY - NSPEDSNEONOTESA_OBGYN_ALL_OB_FT
Peds called to delivery for di-di C section. 38.0 wk AGA male born via CS to a 32 y/o  mother. Mother admitted for contractions and early labor.  No significant maternal history. Maternal labs include Blood Type B+ , HIV - , RPR NR , Rubella I , Hep B - , GBS + (received ampx1 @ 0302 on ). ROM at delivery with clear  fluids (ROM hours: 0H).  Baby emerged vigorous, crying, was w/d/s/s with APGARS of 8/9 . Resuscitation included: stim, deep suction. Mom plans to initiate breastfeeding / formula feed, consents / declines Hep B vaccine and consents circ.  Highest maternal temp: 98.6. EOS NC. Admitted to [facility].     Physical Exam:  Gen: no acute distress, +grimace  HEENT:  anterior fontanel open soft and flat, nondysmorphic facies, no cleft lip/palate, ears normal set, no ear pits or tags, nares clinically patent  Resp: Normal respiratory effort without grunting or retractions, good air entry b/l, clear to auscultation bilaterally  Cardio: Present S1/S2, regular rate and rhythm, no murmurs  Abd: soft, non tender, non distended, umbilical cord with 3 vessels  Neuro: +palmar and plantar grasp, +suck, +jorge, normal tone  Extremities: negative ayoub and ortolani maneuvers, moving all extremities, no clavicular crepitus or stepoff  Skin: pink, warm  Genitals: Normal male anatomy, testicles palpable in scrotum b/l, Gary 1, anus patent Peds called to delivery for di-di C section. 38.0 wk SGA female born via CS to a 33 y/o  mother. Mother admitted for contractions and early labor.  No significant maternal history. Maternal labs include Blood Type B+ , HIV - , RPR NR , Rubella I , Hep B - , GBS + (received ampx1 @ 0302 on ). ROM at delivery with clear  fluids (ROM hours: 0H).  Baby emerged vigorous, crying, was w/d/s/s with APGARS of 8/9 . Resuscitation included: stim, suction. Mom plans to initiate breastfeeding / formula feed, consents / declines Hep B vaccine. Highest maternal temp: 98.6. EOS NC. Admitted to NBN.     Physical Exam:  Gen: no acute distress, +grimace  HEENT:  anterior fontanel open soft and flat, nondysmorphic facies, no cleft lip/palate, ears normal set, no ear pits or tags, nares clinically patent  Resp: Normal respiratory effort without grunting or retractions, good air entry b/l, clear to auscultation bilaterally  Cardio: Present S1/S2, regular rate and rhythm, no murmurs  Abd: soft, non tender, non distended, umbilical cord with 3 vessels  Neuro: +palmar and plantar grasp, +suck, +jorge, normal tone  Extremities: negative ayoub and ortolani maneuvers, moving all extremities, no clavicular crepitus or stepoff  Skin: pink, warm  Genitals: Normal female anatomy, Gary 1, anus patent Peds called to delivery for di-di C section. 38.0 wk SGA female and AGA male born via CS to a 31 y/o  mother. Mother admitted for contractions and early labor.  No significant maternal history. Maternal labs include Blood Type B+ , HIV - , RPR NR , Rubella I , Hep B - , GBS + (received ampx1 @ 0302 on ). ROM at delivery with clear  fluids (ROM hours: 0H).  Baby B (girl) presented breech. Both babies emerged vigorous, crying, were w/d/s/s with APGARS of 8/9 . Resuscitation included: stim, suction. Mom plans to initiate breastfeeding / formula feed, consents / declines Hep B vaccine. Highest maternal temp: 98.6. EOS NC. Admitted to NBN.     Physical Exam (Baby A, boy)  Physical Exam:  Gen: no acute distress, +grimace  HEENT:  anterior fontanel open soft and flat, nondysmorphic facies, no cleft lip/palate, ears normal set, no ear pits or tags, nares clinically patent  Resp: Normal respiratory effort without grunting or retractions, good air entry b/l, clear to auscultation bilaterally  Cardio: Present S1/S2, regular rate and rhythm, no murmurs  Abd: soft, non tender, non distended, umbilical cord with 3 vessels  Neuro: +palmar and plantar grasp, +suck, +jorge, normal tone  Extremities: negative ayoub and ortolani maneuvers, moving all extremities, no clavicular crepitus or stepoff  Skin: pink, warm  Genitals: Normal male anatomy, testicles palpable in scrotum b/l, Gary 1, anus patent    Physical Exam (baby B, girl):   Physical Exam:  Gen: no acute distress, +grimace  HEENT:  anterior fontanel open soft and flat, nondysmorphic facies, no cleft lip/palate, ears normal set, no ear pits or tags, nares clinically patent  Resp: Normal respiratory effort without grunting or retractions, good air entry b/l, clear to auscultation bilaterally  Cardio: Present S1/S2, regular rate and rhythm, no murmurs  Abd: soft, non tender, non distended, umbilical cord with 3 vessels  Neuro: +palmar and plantar grasp, +suck, +jorge, normal tone  Extremities: negative ayoub and ortolani maneuvers, moving all extremities, no clavicular crepitus or stepoff  Skin: pink, warm  Genitals: Normal female anatomy, Gary 1, anus patent    Yelena Gastelum PA-C was present at this delivery.

## 2024-07-07 NOTE — LACTATION INITIAL EVALUATION - POTENTIAL FOR
ineffective breastfeeding/sore breast/s/sore nipples/knowledge deficit/feeding confusion/latch on difficulty/low supply

## 2024-07-07 NOTE — LACTATION INITIAL EVALUATION - DELIVERY MODE
Structural Heart Team    Mr Alberto was seen sitting up in bed, comfortably and without complaints.  He denies chest pain/pressure, sob and dizziness as well as groin pain.  He had an episode of afib overnight Saturday which lasted until Sunday evening.        REVIEW OF SYSTEMS:    CONSTITUTIONAL: No weakness, fevers or chills  EYES/ENT: No visual changes;  No vertigo or throat pain   NECK: No pain or stiffness  RESPIRATORY: No cough, wheezing, hemoptysis; No shortness of breath  CARDIOVASCULAR: No chest pain or palpitations  GASTROINTESTINAL: No abdominal or epigastric pain. No nausea, vomiting, or hematemesis; No diarrhea or constipation. No melena or hematochezia.  GENITOURINARY: No dysuria, frequency or hematuria  NEUROLOGICAL: No numbness or weakness  SKIN: No itching, rashes      Allergies    statins (Other)  Zetia (Other)    Intolerances      Vital Signs Last 24 Hrs  T(C): 36.7 (11 Jan 2021 04:32), Max: 36.7 (10 Nikunj 2021 13:35)  T(F): 98.1 (11 Jan 2021 04:32), Max: 98.1 (10 Nikunj 2021 13:35)  HR: 98 (11 Jan 2021 04:32) (61 - 100)  BP: 157/81 (11 Jan 2021 04:32) (136/80 - 164/80)  BP(mean): --  RR: 18 (11 Jan 2021 04:32) (17 - 18)  SpO2: 95% (11 Jan 2021 04:32) (95% - 100%)    MEDICATIONS  (STANDING):  acetaminophen  IVPB .. 1000 milliGRAM(s) IV Intermittent once  aMIOdarone    Tablet 400 milliGRAM(s) Oral every 8 hours  amLODIPine   Tablet 10 milliGRAM(s) Oral daily  aspirin enteric coated 81 milliGRAM(s) Oral daily  cefuroxime  IVPB 1500 milliGRAM(s) IV Intermittent once  dextrose 40% Gel 15 Gram(s) Oral once  dextrose 5%. 1000 milliLiter(s) (50 mL/Hr) IV Continuous <Continuous>  dextrose 5%. 1000 milliLiter(s) (100 mL/Hr) IV Continuous <Continuous>  dextrose 50% Injectable 25 Gram(s) IV Push once  dextrose 50% Injectable 12.5 Gram(s) IV Push once  dextrose 50% Injectable 25 Gram(s) IV Push once  glucagon  Injectable 1 milliGRAM(s) IntraMuscular once  influenza   Vaccine 0.5 milliLiter(s) IntraMuscular once  insulin lispro (ADMELOG) corrective regimen sliding scale   SubCutaneous three times a day before meals  insulin lispro Injectable (ADMELOG) 3 Unit(s) SubCutaneous before breakfast  insulin lispro Injectable (ADMELOG) 3 Unit(s) SubCutaneous before lunch  insulin lispro Injectable (ADMELOG) 3 Unit(s) SubCutaneous before dinner  lisinopril 20 milliGRAM(s) Oral daily  metFORMIN 1000 milliGRAM(s) Oral two times a day with meals  metoprolol succinate ER 25 milliGRAM(s) Oral daily  potassium chloride    Tablet ER 20 milliEquivalent(s) Oral once  tamsulosin 0.4 milliGRAM(s) Oral at bedtime      Exam-  General: NAD, WDWN, appropriate affect  Cor: s1s2, RRR, no murmur   EKG/Tele: SR   Pulm: Clear, no wheezes, rales, or rhonchi, no use of accessory muscles  Gastointestinal: soft, nontender, nondistended, +bowel sounds  Extremities: no edema, 1+ DP pulses b/l  Neuro: A&Ox3, nonfocal                          11.0   7.53  )-----------( 181      ( 11 Jan 2021 04:35 )             34.2   01-11    136  |  102  |  18  ----------------------------<  203<H>  3.8   |  22  |  0.52    Ca    8.8      11 Jan 2021 04:35    TPro  6.2  /  Alb  3.6  /  TBili  0.6  /  DBili  x   /  AST  26  /  ALT  40  /  AlkPhos  30<L>  01-10    I&O's Summary    10 Nikunj 2021 07:01  -  11 Jan 2021 07:00  --------------------------------------------------------  IN: 700 mL / OUT: 1250 mL / NET: -550 mL    11 Jan 2021 07:01  -  11 Jan 2021 11:13  --------------------------------------------------------  IN: 200 mL / OUT: 0 mL / NET: 200 mL              Assessment/Plan:  Mr Alberto is POD 3 s/p TF TAVR (Betsy) for severe symptomatic AS  - afib yesterday  - on amio load   - post TAVR TTE done and reviewed  - likely d/c home Wednesday after Amio load   -- will d/c home with an MCOT to monitor for post TAVR conduction delays and arrhythmias for 30 days    - Discharge plan: follow up with Dr. Young in one week and follow up with Structural Heart Team in one month.  Echo will be done at 1 month follow up visit.  Plan discussed with patient     Mj Kraft, PA  [phone]     breast

## 2024-07-08 LAB
BASOPHILS # BLD AUTO: 0.02 K/UL — SIGNIFICANT CHANGE UP (ref 0–0.2)
BASOPHILS NFR BLD AUTO: 0.2 % — SIGNIFICANT CHANGE UP (ref 0–2)
EOSINOPHIL # BLD AUTO: 0.05 K/UL — SIGNIFICANT CHANGE UP (ref 0–0.5)
EOSINOPHIL NFR BLD AUTO: 0.4 % — SIGNIFICANT CHANGE UP (ref 0–6)
HCT VFR BLD CALC: 26.6 % — LOW (ref 34.5–45)
HGB BLD-MCNC: 9.4 G/DL — LOW (ref 11.5–15.5)
IANC: 8.86 K/UL — HIGH (ref 1.8–7.4)
IMM GRANULOCYTES NFR BLD AUTO: 0.3 % — SIGNIFICANT CHANGE UP (ref 0–0.9)
LYMPHOCYTES # BLD AUTO: 1.73 K/UL — SIGNIFICANT CHANGE UP (ref 1–3.3)
LYMPHOCYTES # BLD AUTO: 15.1 % — SIGNIFICANT CHANGE UP (ref 13–44)
MCHC RBC-ENTMCNC: 33.7 PG — SIGNIFICANT CHANGE UP (ref 27–34)
MCHC RBC-ENTMCNC: 35.3 GM/DL — SIGNIFICANT CHANGE UP (ref 32–36)
MCV RBC AUTO: 95.3 FL — SIGNIFICANT CHANGE UP (ref 80–100)
MONOCYTES # BLD AUTO: 0.75 K/UL — SIGNIFICANT CHANGE UP (ref 0–0.9)
MONOCYTES NFR BLD AUTO: 6.6 % — SIGNIFICANT CHANGE UP (ref 2–14)
NEUTROPHILS # BLD AUTO: 8.86 K/UL — HIGH (ref 1.8–7.4)
NEUTROPHILS NFR BLD AUTO: 77.4 % — HIGH (ref 43–77)
NRBC # BLD: 0 /100 WBCS — SIGNIFICANT CHANGE UP (ref 0–0)
NRBC # FLD: 0 K/UL — SIGNIFICANT CHANGE UP (ref 0–0)
PLATELET # BLD AUTO: 199 K/UL — SIGNIFICANT CHANGE UP (ref 150–400)
RBC # BLD: 2.79 M/UL — LOW (ref 3.8–5.2)
RBC # FLD: 11.8 % — SIGNIFICANT CHANGE UP (ref 10.3–14.5)
T PALLIDUM AB TITR SER: NEGATIVE — SIGNIFICANT CHANGE UP
WBC # BLD: 11.45 K/UL — HIGH (ref 3.8–10.5)
WBC # FLD AUTO: 11.45 K/UL — HIGH (ref 3.8–10.5)

## 2024-07-08 RX ORDER — OXYCODONE HYDROCHLORIDE 100 MG/5ML
5 SOLUTION ORAL
Refills: 0 | Status: DISCONTINUED | OUTPATIENT
Start: 2024-07-08 | End: 2024-07-10

## 2024-07-08 RX ORDER — DIPHENHYDRAMINE HCL 12.5MG/5ML
25 ELIXIR ORAL EVERY 6 HOURS
Refills: 0 | Status: DISCONTINUED | OUTPATIENT
Start: 2024-07-08 | End: 2024-07-10

## 2024-07-08 RX ADMIN — OXYCODONE HYDROCHLORIDE 5 MILLIGRAM(S): 100 SOLUTION ORAL at 17:26

## 2024-07-08 RX ADMIN — Medication 600 MILLIGRAM(S): at 18:20

## 2024-07-08 RX ADMIN — HEPARIN SODIUM 5000 UNIT(S): 50 INJECTION, SOLUTION INTRAVENOUS at 11:56

## 2024-07-08 RX ADMIN — Medication 600 MILLIGRAM(S): at 12:50

## 2024-07-08 RX ADMIN — KETOROLAC TROMETHAMINE 30 MILLIGRAM(S): 30 INJECTION, SOLUTION INTRAMUSCULAR at 00:17

## 2024-07-08 RX ADMIN — Medication 975 MILLIGRAM(S): at 21:12

## 2024-07-08 RX ADMIN — Medication 975 MILLIGRAM(S): at 14:17

## 2024-07-08 RX ADMIN — KETOROLAC TROMETHAMINE 30 MILLIGRAM(S): 30 INJECTION, SOLUTION INTRAMUSCULAR at 06:21

## 2024-07-08 RX ADMIN — Medication 325 MILLIGRAM(S): at 11:55

## 2024-07-08 RX ADMIN — Medication 600 MILLIGRAM(S): at 17:26

## 2024-07-08 RX ADMIN — Medication 975 MILLIGRAM(S): at 03:37

## 2024-07-08 RX ADMIN — OXYCODONE HYDROCHLORIDE 5 MILLIGRAM(S): 100 SOLUTION ORAL at 18:20

## 2024-07-08 RX ADMIN — Medication 25 MILLIGRAM(S): at 03:37

## 2024-07-08 RX ADMIN — Medication 600 MILLIGRAM(S): at 11:55

## 2024-07-08 RX ADMIN — Medication 975 MILLIGRAM(S): at 22:00

## 2024-07-08 RX ADMIN — Medication 975 MILLIGRAM(S): at 15:15

## 2024-07-08 RX ADMIN — Medication 975 MILLIGRAM(S): at 04:07

## 2024-07-08 RX ADMIN — Medication 1 TABLET(S): at 11:55

## 2024-07-08 RX ADMIN — OXYCODONE HYDROCHLORIDE 5 MILLIGRAM(S): 100 SOLUTION ORAL at 14:17

## 2024-07-08 RX ADMIN — OXYCODONE HYDROCHLORIDE 5 MILLIGRAM(S): 100 SOLUTION ORAL at 15:15

## 2024-07-08 RX ADMIN — Medication 975 MILLIGRAM(S): at 09:10

## 2024-07-08 RX ADMIN — Medication 80 MILLIGRAM(S): at 12:02

## 2024-07-08 RX ADMIN — Medication 80 MILLIGRAM(S): at 17:26

## 2024-07-08 RX ADMIN — Medication 975 MILLIGRAM(S): at 08:13

## 2024-07-08 NOTE — PROGRESS NOTE ADULT - SUBJECTIVE AND OBJECTIVE BOX
OB Attending Progress Note:  Delivery, POD#1    S: 32yo POD#1 s/p LTCS for TIUP . Her pain is well controlled. She is tolerating a regular diet and passing flatus. Denies N/V. Denies CP/SOB/lightheadedness/dizziness.   She is ambulating without difficulty.   Voiding spontanously.     O:   Vital Signs Last 24 Hrs  T(C): 36.6 (2024 05:02), Max: 37.1 (2024 14:31)  HR: 64 (2024 05:02) (60 - 82)  BP: 138/81 (2024 05:02) (130/81 - 138/81)  RR: 18 (2024 05:02) (17 - 18)  SpO2: 100% (2024 05:02) (99% - 100%)    Parameters below as of 2024 05:02  Patient On (Oxygen Delivery Method): room air        Labs:  Blood type: B Positive  Rubella IgG: RPR:                           9.4<L>   11.45<H> >-----------< 199    (  @ 05:18 )             26.6<L>                        10.9<L>   6.98 >-----------< 222    (  @ 02:15 )             31.0<L>    24 @ 02:15      138  |  103  |  5<L>  ----------------------------<  76  3.8   |  21<L>  |  0.83        Ca    9.5      2024 02:15    TPro  6.6  /  Alb  3.5  /  TBili  1.3<H>  /  DBili  x   /  AST  14  /  ALT  10  /  AlkPhos  178<H>  24 @ 02:15          PE:  General: NAD  Abdomen: Mildly distended, appropriately tender, incision c/d/i.  Extremities: No erythema, no pitting edema

## 2024-07-08 NOTE — PROGRESS NOTE ADULT - ASSESSMENT
A/P: 34yo POD#1 s/p LTCS.  H/H appropriate for QBL.  Patient is stable and doing well post-operatively.    - Continue regular diet.  - Increase ambulation.  - Continue motrin, tylenol, oxycodone PRN for pain control.     AMBAR Gomez MD

## 2024-07-08 NOTE — PROVIDER CONTACT NOTE (OTHER) - ACTION/TREATMENT ORDERED:
Corina MICHAELS notified and made aware. As per Sam MICHAELS "give the pt another pitcher of water and have her walk around and try again in a hour"

## 2024-07-08 NOTE — PROGRESS NOTE ADULT - SUBJECTIVE AND OBJECTIVE BOX
INTERVAL HPI/OVERNIGHT EVENTS:  33y Female s/p c section under spinal anesthesia with duramorph for post op analgesia on     Vital Signs Last 24 Hrs  T(C): 37 (08 Jul 2024 10:19), Max: 37.1 (07 Jul 2024 14:31)  T(F): 98.6 (08 Jul 2024 10:19), Max: 98.7 (07 Jul 2024 14:31)  HR: 81 (08 Jul 2024 10:19) (60 - 82)  BP: 130/83 (08 Jul 2024 10:19) (130/81 - 138/81)  BP(mean): --  RR: 17 (08 Jul 2024 10:19) (17 - 18)  SpO2: 100% (08 Jul 2024 10:19) (99% - 100%)    Parameters below as of 08 Jul 2024 10:19  Patient On (Oxygen Delivery Method): room air      Patient's overall anesthesia satisfaction: Positive    Patients pain is well controlled with IT duramorph    No respiratory events overnight    No pruritis at this time    Patient doing well     No headache      No residual numbness or weakness, sensory and motor function intact.    No anesthetic complications or complaints noted or reported          .

## 2024-07-09 RX ADMIN — Medication 975 MILLIGRAM(S): at 15:08

## 2024-07-09 RX ADMIN — Medication 600 MILLIGRAM(S): at 23:56

## 2024-07-09 RX ADMIN — Medication 600 MILLIGRAM(S): at 06:20

## 2024-07-09 RX ADMIN — Medication 600 MILLIGRAM(S): at 13:00

## 2024-07-09 RX ADMIN — Medication 600 MILLIGRAM(S): at 00:28

## 2024-07-09 RX ADMIN — HEPARIN SODIUM 5000 UNIT(S): 50 INJECTION, SOLUTION INTRAVENOUS at 23:35

## 2024-07-09 RX ADMIN — Medication 325 MILLIGRAM(S): at 12:29

## 2024-07-09 RX ADMIN — Medication 600 MILLIGRAM(S): at 23:26

## 2024-07-09 RX ADMIN — HEPARIN SODIUM 5000 UNIT(S): 50 INJECTION, SOLUTION INTRAVENOUS at 00:28

## 2024-07-09 RX ADMIN — Medication 2 TABLET(S): at 00:29

## 2024-07-09 RX ADMIN — HEPARIN SODIUM 5000 UNIT(S): 50 INJECTION, SOLUTION INTRAVENOUS at 12:30

## 2024-07-09 RX ADMIN — Medication 975 MILLIGRAM(S): at 20:40

## 2024-07-09 RX ADMIN — Medication 80 MILLIGRAM(S): at 00:28

## 2024-07-09 RX ADMIN — OXYCODONE HYDROCHLORIDE 5 MILLIGRAM(S): 100 SOLUTION ORAL at 15:08

## 2024-07-09 RX ADMIN — Medication 975 MILLIGRAM(S): at 15:40

## 2024-07-09 RX ADMIN — Medication 975 MILLIGRAM(S): at 20:10

## 2024-07-09 RX ADMIN — Medication 1 TABLET(S): at 12:29

## 2024-07-09 RX ADMIN — Medication 600 MILLIGRAM(S): at 01:00

## 2024-07-09 RX ADMIN — Medication 600 MILLIGRAM(S): at 12:29

## 2024-07-09 RX ADMIN — Medication 975 MILLIGRAM(S): at 08:53

## 2024-07-09 RX ADMIN — Medication 600 MILLIGRAM(S): at 18:35

## 2024-07-09 RX ADMIN — Medication 600 MILLIGRAM(S): at 19:05

## 2024-07-09 RX ADMIN — Medication 2 TABLET(S): at 23:26

## 2024-07-09 RX ADMIN — Medication 600 MILLIGRAM(S): at 05:49

## 2024-07-09 RX ADMIN — Medication 975 MILLIGRAM(S): at 09:33

## 2024-07-09 NOTE — CHART NOTE - NSCHARTNOTEFT_GEN_A_CORE
Discussed with patient regarding gHTN.      -Patient has BP Cuff at home  -instructions given on BP monitoring; TID; call MD office if greater than 140/90; report to emergency room is greater than 160/110  -reviewed signs and symptoms related to preeclampsia with patient such as HA, Change in vision, N/V. RUQ pain   -keep log BP's to present to MD during appointment in the week for BP check  -All questions answered, patient verbalized understanding     Alicia Bond NP

## 2024-07-09 NOTE — PROGRESS NOTE ADULT - SUBJECTIVE AND OBJECTIVE BOX
She is a  33y woman who is now post-operative day 2:     Subjective:  The patient feels well.  She is ambulating.   She is tolerating regular diet.  She denies nausea and vomiting.  She is voiding.  Her pain is controlled.  She reports normal postpartum bleeding.      Objective:  Vital Signs Last 24 Hrs  T(C): 37 (2024 10:00), Max: 37 (2024 10:00)  T(F): 98.6 (2024 10:00), Max: 98.6 (2024 10:00)  HR: 94 (2024 10:00) (81 - 95)  BP: 125/72 (2024 10:00) (125/72 - 145/84)  BP(mean): --  RR: 17 (2024 10:00) (17 - 18)  SpO2: 100% (2024 10:00) (100% - 100%)    Parameters below as of 2024 10:00  Patient On (Oxygen Delivery Method): room air        Constitutional: NAD  Abdomen: Soft, nontender, no distension, firm uterine fundus  Incision: Clean, dry, and intact  Ext: No calf tenderness bilaterally    LABS:                        9.4    11.45 )-----------( 199      ( 2024 05:18 )             26.6                         10.9   6.98  )-----------( 222      ( 2024 02:15 )             31.0         Allergies    No Known Allergies    Intolerances      MEDICATIONS  (STANDING):  acetaminophen     Tablet .. 975 milliGRAM(s) Oral <User Schedule>  diphtheria/tetanus/pertussis (acellular) Vaccine (Adacel) 0.5 milliLiter(s) IntraMuscular once  ferrous    sulfate 325 milliGRAM(s) Oral daily  heparin   Injectable 5000 Unit(s) SubCutaneous every 12 hours  ibuprofen  Tablet. 600 milliGRAM(s) Oral every 6 hours  prenatal multivitamin 1 Tablet(s) Oral daily  senna 2 Tablet(s) Oral at bedtime    MEDICATIONS  (PRN):  diphenhydrAMINE 25 milliGRAM(s) Oral every 6 hours PRN Pruritus  lanolin Ointment 1 Application(s) Topical every 6 hours PRN Sore Nipples  magnesium hydroxide Suspension 30 milliLiter(s) Oral two times a day PRN Constipation  oxyCODONE    IR 5 milliGRAM(s) Oral every 3 hours PRN Moderate to Severe Pain (4-10)  oxyCODONE    IR 5 milliGRAM(s) Oral once PRN Moderate to Severe Pain (4-10)  simethicone 80 milliGRAM(s) Chew every 4 hours PRN Gas        Assessment and Plan  Pt stable POD #2 s/p  section  Gestational HTN  - pt asymptomatic, monitor BPs  PP  -continues postoperative and postpartum care  -encourage ambulation

## 2024-07-10 VITALS
TEMPERATURE: 98 F | SYSTOLIC BLOOD PRESSURE: 130 MMHG | DIASTOLIC BLOOD PRESSURE: 89 MMHG | RESPIRATION RATE: 18 BRPM | OXYGEN SATURATION: 100 % | HEART RATE: 83 BPM

## 2024-07-10 RX ORDER — FERROUS SULFATE 325(65) MG
1 TABLET ORAL
Qty: 0 | Refills: 0 | DISCHARGE
Start: 2024-07-10

## 2024-07-10 RX ORDER — ACETAMINOPHEN 325 MG
3 TABLET ORAL
Qty: 0 | Refills: 0 | DISCHARGE
Start: 2024-07-10

## 2024-07-10 RX ADMIN — Medication 975 MILLIGRAM(S): at 09:30

## 2024-07-10 RX ADMIN — Medication 30 MILLILITER(S): at 08:57

## 2024-07-10 RX ADMIN — Medication 600 MILLIGRAM(S): at 13:17

## 2024-07-10 RX ADMIN — Medication 975 MILLIGRAM(S): at 02:46

## 2024-07-10 RX ADMIN — Medication 600 MILLIGRAM(S): at 06:20

## 2024-07-10 RX ADMIN — Medication 325 MILLIGRAM(S): at 12:47

## 2024-07-10 RX ADMIN — Medication 1 TABLET(S): at 12:47

## 2024-07-10 RX ADMIN — Medication 80 MILLIGRAM(S): at 12:46

## 2024-07-10 RX ADMIN — HEPARIN SODIUM 5000 UNIT(S): 50 INJECTION, SOLUTION INTRAVENOUS at 12:45

## 2024-07-10 RX ADMIN — Medication 975 MILLIGRAM(S): at 08:57

## 2024-07-10 RX ADMIN — Medication 975 MILLIGRAM(S): at 02:16

## 2024-07-10 RX ADMIN — Medication 600 MILLIGRAM(S): at 05:50

## 2024-07-10 RX ADMIN — Medication 600 MILLIGRAM(S): at 12:47

## 2024-07-10 NOTE — PROGRESS NOTE ADULT - SUBJECTIVE AND OBJECTIVE BOX
S: 32yo POD#3 s/p PLTCS for breech di-di twins c/b gHTN. Blood pressures are controlled w/o medication. PCR 0.2, HELLP wnl. Denies s/s of PEC. , H/H 10.9/31->9.4/26.6. The patient feels well. Pain is well controlled. She is tolerating a regular diet and passing flatus. She is voiding spontaneously, and ambulating without difficulty. Denies CP/SOB. Denies lightheadedness/dizziness. Denies N/V, headache, visual change, RUQ pain.     O:  Vitals:  Vital Signs Last 24 Hrs  T(C): 36.7 (10 Jul 2024 05:27), Max: 37 (09 Jul 2024 10:00)  T(F): 98 (10 Jul 2024 05:27), Max: 98.6 (09 Jul 2024 10:00)  HR: 75 (10 Jul 2024 05:27) (65 - 94)  BP: 134/83 (10 Jul 2024 05:27) (125/72 - 137/84)  BP(mean): --  RR: 18 (10 Jul 2024 05:27) (17 - 18)  SpO2: 100% (10 Jul 2024 05:27) (99% - 100%)    Parameters above as of 10 Jul 2024 05:27  Patient On (Oxygen Delivery Method): room air        MEDICATIONS  (STANDING):  acetaminophen     Tablet .. 975 milliGRAM(s) Oral <User Schedule>  diphtheria/tetanus/pertussis (acellular) Vaccine (Adacel) 0.5 milliLiter(s) IntraMuscular once  ferrous    sulfate 325 milliGRAM(s) Oral daily  heparin   Injectable 5000 Unit(s) SubCutaneous every 12 hours  ibuprofen  Tablet. 600 milliGRAM(s) Oral every 6 hours  prenatal multivitamin 1 Tablet(s) Oral daily  senna 2 Tablet(s) Oral at bedtime    MEDICATIONS  (PRN):  diphenhydrAMINE 25 milliGRAM(s) Oral every 6 hours PRN Pruritus  lanolin Ointment 1 Application(s) Topical every 6 hours PRN Sore Nipples  magnesium hydroxide Suspension 30 milliLiter(s) Oral two times a day PRN Constipation  oxyCODONE    IR 5 milliGRAM(s) Oral once PRN Moderate to Severe Pain (4-10)  oxyCODONE    IR 5 milliGRAM(s) Oral every 3 hours PRN Moderate to Severe Pain (4-10)  simethicone 80 milliGRAM(s) Chew every 4 hours PRN Gas      LABS:  Blood type: B Positive  Rubella IgG: RPR: Negative                          9.4<L>   11.45<H> >-----------< 199    ( 07-08 @ 05:18 )             26.6<L>      Physical exam:  Gen: NAD  Abdomen: Soft, nontender, no distension , firm uterine fundus 2-3 below umbilicus.     Incision: Clean, dry, and intact   Pelvic: Normal lochia noted  Ext: No calf tenderness/erythema. Trace pedal edema.     A/P: 32yo POD#3 s/p pLTCS for breech di-di twins c/b gHTN. Patient is stable and is doing well post-operatively.    #gHTN  -Blood pressures controlled w/o medication  -PCR 0.2, HELLP wnl  -Denies s/s of PEC  --Has BP Cuff at home, offered a prescription for BP cuff but patient declined   -instructions given on BP monitoring; TID; call MD office if greater than 140/90; report to emergency room is greater than 160/110  -reviewed signs and symptoms related to preeclampsia with patient such as HA, Change in vision, N/V. RUQ pain   -keep log BP's to present to MD during appointment for BP check in 3 to 5 days  -All questions answered, patient verbalized understanding     #Acute blood loss anemia  -  -H/H 10.9/31->9.4/26.6  -Continue iron daily    #Post-Partum  - Continue Motrin, Tylenol, Oxycodone PRN for pain control.  - Encouraged use of abdominal binder  - Increase ambulation; SCDs when not ambulating  - Continue regular diet  - Discharge plan-stable for d/c today    ELIER Damian-BC         S: 34yo POD#3 s/p PLTCS for breech di-di twins c/b gHTN. Blood pressures are controlled w/o medication. PCR 0.2, HELLP wnl. Denies s/s of PEC. , H/H 10.9/31->9.4/26.6. The patient feels well. Pain is well controlled. She is tolerating a regular diet and passing flatus. She is voiding spontaneously, and ambulating without difficulty. Denies CP/SOB. Denies lightheadedness/dizziness. Denies N/V, headache, visual change, RUQ pain.     O:  Vitals:  Vital Signs Last 24 Hrs  T(C): 36.7 (10 Jul 2024 05:27), Max: 37 (09 Jul 2024 10:00)  T(F): 98 (10 Jul 2024 05:27), Max: 98.6 (09 Jul 2024 10:00)  HR: 75 (10 Jul 2024 05:27) (65 - 94)  BP: 134/83 (10 Jul 2024 05:27) (125/72 - 137/84)  BP(mean): --  RR: 18 (10 Jul 2024 05:27) (17 - 18)  SpO2: 100% (10 Jul 2024 05:27) (99% - 100%)    Parameters above as of 10 Jul 2024 05:27  Patient On (Oxygen Delivery Method): room air        MEDICATIONS  (STANDING):  acetaminophen     Tablet .. 975 milliGRAM(s) Oral <User Schedule>  diphtheria/tetanus/pertussis (acellular) Vaccine (Adacel) 0.5 milliLiter(s) IntraMuscular once  ferrous    sulfate 325 milliGRAM(s) Oral daily  heparin   Injectable 5000 Unit(s) SubCutaneous every 12 hours  ibuprofen  Tablet. 600 milliGRAM(s) Oral every 6 hours  prenatal multivitamin 1 Tablet(s) Oral daily  senna 2 Tablet(s) Oral at bedtime    MEDICATIONS  (PRN):  diphenhydrAMINE 25 milliGRAM(s) Oral every 6 hours PRN Pruritus  lanolin Ointment 1 Application(s) Topical every 6 hours PRN Sore Nipples  magnesium hydroxide Suspension 30 milliLiter(s) Oral two times a day PRN Constipation  oxyCODONE    IR 5 milliGRAM(s) Oral once PRN Moderate to Severe Pain (4-10)  oxyCODONE    IR 5 milliGRAM(s) Oral every 3 hours PRN Moderate to Severe Pain (4-10)  simethicone 80 milliGRAM(s) Chew every 4 hours PRN Gas      LABS:  Blood type: B Positive  Rubella IgG: RPR: Negative                          9.4<L>   11.45<H> >-----------< 199    ( 07-08 @ 05:18 )             26.6<L>      Physical exam:  Gen: NAD  Abdomen: Soft, nontender, no distension , firm uterine fundus 2-3 below umbilicus.     Incision: Clean, dry, and intact   Pelvic: Normal lochia noted  Ext: No calf tenderness/erythema. Trace pedal edema.     A/P: 34yo POD#3 s/p pLTCS for breech di-di twins c/b gHTN. Patient is stable and is doing well post-operatively.    #gHTN  -Blood pressures controlled w/o medication  -PCR 0.2, HELLP wnl  -Denies s/s of PEC  --Has BP Cuff at home, offered a prescription for BP cuff but patient declined   -instructions given on BP monitoring; TID; call MD office if greater than 140/90; report to emergency room is greater than 160/110  -reviewed signs and symptoms related to preeclampsia with patient such as HA, Change in vision, N/V. RUQ pain   -keep log BP's to present to MD during appointment for BP check in 3 to 5 days  -All questions answered, patient verbalized understanding     #Acute blood loss anemia  -  -H/H 10.9/31->9.4/26.6  -Continue iron daily    #Post-Partum  - Continue Motrin, Tylenol, Oxycodone PRN for pain control.  - Encouraged use of abdominal binder  - Increase ambulation; SCDs when not ambulating  - Continue regular diet  - Discharge plan-stable for d/c today    ELIER Damian-BC    OB Attending  Patient seen and agree with above.    Stable for discharge  RV x 1 week BP check  CHANEL Quach

## 2024-07-11 ENCOUNTER — NON-APPOINTMENT (OUTPATIENT)
Age: 34
End: 2024-07-11

## 2024-07-11 DIAGNOSIS — F41.9 ANXIETY DISORDER, UNSPECIFIED: ICD-10-CM

## 2024-07-11 DIAGNOSIS — O13.3 GESTATIONAL [PREGNANCY-INDUCED] HYPERTENSION W/OUT SIGNIFICANT PROTEINURIA, THIRD TRIMESTER: ICD-10-CM

## 2024-07-11 RX ORDER — IBUPROFEN 600 MG/1
600 TABLET ORAL 4 TIMES DAILY
Refills: 0 | Status: ACTIVE | COMMUNITY
Start: 2024-07-11

## 2024-07-11 RX ORDER — CHLORHEXIDINE GLUCONATE 4 %
325 (65 FE) LIQUID (ML) TOPICAL DAILY
Refills: 0 | Status: ACTIVE | COMMUNITY
Start: 2024-07-11

## 2024-07-11 RX ORDER — ACETAMINOPHEN 500 MG/1
500 TABLET ORAL 3 TIMES DAILY
Refills: 0 | Status: ACTIVE | COMMUNITY
Start: 2024-07-11

## 2024-07-24 ENCOUNTER — NON-APPOINTMENT (OUTPATIENT)
Age: 34
End: 2024-07-24

## 2024-07-25 ENCOUNTER — NON-APPOINTMENT (OUTPATIENT)
Age: 34
End: 2024-07-25

## 2024-07-25 LAB — SURGICAL PATHOLOGY STUDY: SIGNIFICANT CHANGE UP

## 2024-07-29 ENCOUNTER — TRANSCRIPTION ENCOUNTER (OUTPATIENT)
Age: 34
End: 2024-07-29

## 2024-07-30 ENCOUNTER — NON-APPOINTMENT (OUTPATIENT)
Age: 34
End: 2024-07-30

## 2024-07-30 RX ORDER — ESCITALOPRAM OXALATE 10 MG/1
10 TABLET, FILM COATED ORAL DAILY
Refills: 0 | Status: ACTIVE | COMMUNITY
Start: 2024-07-30

## 2024-08-11 ENCOUNTER — NON-APPOINTMENT (OUTPATIENT)
Age: 34
End: 2024-08-11

## 2024-08-18 ENCOUNTER — NON-APPOINTMENT (OUTPATIENT)
Age: 34
End: 2024-08-18
